# Patient Record
(demographics unavailable — no encounter records)

---

## 2025-02-25 NOTE — PHYSICAL EXAM
[Chaperone Declined] : Patient declined chaperone [Appropriately responsive] : appropriately responsive [Alert] : alert [No Acute Distress] : no acute distress [No Lymphadenopathy] : no lymphadenopathy [Regular Rate Rhythm] : regular rate rhythm [Soft] : soft [Non-tender] : non-tender [Non-distended] : non-distended [No Mass] : no mass [Oriented x3] : oriented x3 [Examination Of The Breasts] : a normal appearance [No Discharge] : no discharge [No Masses] : no breast masses were palpable [No Lesions] : no lesions  [Labia Majora] : normal [Labia Minora] : normal [Normal] : normal [Atrophy] : atrophy [No Bleeding] : There was no active vaginal bleeding [Tenderness] : nontender [Enlarged ___ wks] : not enlarged [Mass ___ cm] : no uterine mass was palpated [Uterine Adnexae] : normal [FreeTextEntry4] : 3cm vaginal cyst noted at anterior introitus appear without significant change, simple appearing, nontender and no solid component palpated- patient continues to want conservative mgmt unless symptomatic. [FreeTextEntry5] : pap smear done.

## 2025-03-01 NOTE — HISTORY OF PRESENT ILLNESS
[Patient reported mammogram was normal] : Patient reported mammogram was normal [Patient reported PAP Smear was normal] : Patient reported PAP Smear was normal [Patient reported colonoscopy was normal] : Patient reported colonoscopy was normal [LMP unknown] : LMP unknown [postmenopausal] : postmenopausal [Y] : Positive pregnancy history [TextBox_4] : 52-year-old para 5-0-0-4 in menopause came for annual GYN exam and Pap smear. Patient denies postmenopausal bleeding, pelvic pain, discharge, dysuria or other GYN complaints. Patient has history of abnormal Pap with HPV but denies other STDs or ovarian cysts. She also has remote history of fibroids but is asymptomatic. She has a known vaginal cyst that she had evaluated by urogynecology. She denies any intervention or symptoms. She is sexually active with 1 male partner-.   [Mammogramdate] : 8/2024 [PapSmeardate] : 2/2024 [ColonoscopyDate] : 1/2022 [TextBox_43] : f/u 5 yrs  [PGHxTotal] : 5 [[facility] [FreeTextEntry1] : 1x c/s 4   [unknown] : Patient is unsure of the date of her LMP [Menarche Age: ____] : age at menarche was [unfilled] [Currently In Menopause] : currently in menopause [Menopause Age: ____] : age at menopause was [unfilled] [Currently Active] : currently active [Gonorrhea test offered] : Gonorrhea test offered [Chlamydia test offered] : Chlamydia test offered [Trichomonas test offered] : Trichomonas test offered [HPV test offered] : HPV test offered [Men] : men [No] : No

## 2025-03-01 NOTE — HISTORY OF PRESENT ILLNESS
[Patient reported mammogram was normal] : Patient reported mammogram was normal [Patient reported PAP Smear was normal] : Patient reported PAP Smear was normal [Patient reported colonoscopy was normal] : Patient reported colonoscopy was normal [LMP unknown] : LMP unknown [postmenopausal] : postmenopausal [Y] : Positive pregnancy history [TextBox_4] : 52-year-old para 5-0-0-4 in menopause came for annual GYN exam and Pap smear. Patient denies postmenopausal bleeding, pelvic pain, discharge, dysuria or other GYN complaints. Patient has history of abnormal Pap with HPV but denies other STDs or ovarian cysts. She also has remote history of fibroids but is asymptomatic. She has a known vaginal cyst that she had evaluated by urogynecology. She denies any intervention or symptoms. She is sexually active with 1 male partner-.   [Mammogramdate] : 8/2024 [PapSmeardate] : 2/2024 [ColonoscopyDate] : 1/2022 [TextBox_43] : f/u 5 yrs  [PGHxTotal] : 5 [facility] [FreeTextEntry1] : 1x c/s 4   [unknown] : Patient is unsure of the date of her LMP [Menarche Age: ____] : age at menarche was [unfilled] [Currently In Menopause] : currently in menopause [Menopause Age: ____] : age at menopause was [unfilled] [Currently Active] : currently active [Gonorrhea test offered] : Gonorrhea test offered [Chlamydia test offered] : Chlamydia test offered [Trichomonas test offered] : Trichomonas test offered [HPV test offered] : HPV test offered [Men] : men [No] : No